# Patient Record
Sex: FEMALE | Race: ASIAN | Employment: FULL TIME | URBAN - METROPOLITAN AREA
[De-identification: names, ages, dates, MRNs, and addresses within clinical notes are randomized per-mention and may not be internally consistent; named-entity substitution may affect disease eponyms.]

---

## 2017-01-04 DIAGNOSIS — N39.0 RECURRENT UTI: Primary | ICD-10-CM

## 2017-05-15 ENCOUNTER — HOSPITAL ENCOUNTER (OUTPATIENT)
Dept: LAB | Age: 27
Discharge: HOME | End: 2017-05-15
Payer: COMMERCIAL

## 2017-05-15 ENCOUNTER — OFFICE VISIT (OUTPATIENT)
Dept: FAMILY MEDICINE CLINIC | Age: 27
End: 2017-05-15

## 2017-05-15 VITALS
WEIGHT: 135 LBS | DIASTOLIC BLOOD PRESSURE: 77 MMHG | TEMPERATURE: 97.1 F | SYSTOLIC BLOOD PRESSURE: 122 MMHG | BODY MASS INDEX: 22.49 KG/M2 | HEART RATE: 83 BPM | HEIGHT: 65 IN | RESPIRATION RATE: 16 BRPM

## 2017-05-15 DIAGNOSIS — Z13.220 SCREENING CHOLESTEROL LEVEL: ICD-10-CM

## 2017-05-15 DIAGNOSIS — Z13.1 DIABETES MELLITUS SCREENING: ICD-10-CM

## 2017-05-15 DIAGNOSIS — N92.6 IRREGULAR MENSES: ICD-10-CM

## 2017-05-15 DIAGNOSIS — Z97.5 IUD (INTRAUTERINE DEVICE) IN PLACE: ICD-10-CM

## 2017-05-15 DIAGNOSIS — H00.012 HORDEOLUM EXTERNUM OF RIGHT LOWER EYELID: ICD-10-CM

## 2017-05-15 DIAGNOSIS — Z13.220 SCREENING CHOLESTEROL LEVEL: Primary | ICD-10-CM

## 2017-05-15 LAB
ANION GAP BLD CALC-SCNC: 6 MMOL/L (ref 3–18)
BILIRUB UR QL STRIP: NEGATIVE
BUN SERPL-MCNC: 11 MG/DL (ref 7–18)
BUN/CREAT SERPL: 14 (ref 12–20)
CALCIUM SERPL-MCNC: 9.3 MG/DL (ref 8.5–10.1)
CHLORIDE SERPL-SCNC: 102 MMOL/L (ref 100–108)
CHOLEST SERPL-MCNC: 159 MG/DL
CO2 SERPL-SCNC: 29 MMOL/L (ref 21–32)
CREAT SERPL-MCNC: 0.81 MG/DL (ref 0.6–1.3)
EST. AVERAGE GLUCOSE BLD GHB EST-MCNC: 111 MG/DL
GLUCOSE SERPL-MCNC: 96 MG/DL (ref 74–99)
GLUCOSE UR-MCNC: NEGATIVE MG/DL
HBA1C MFR BLD: 5.5 % (ref 4.2–5.6)
HCG URINE, QL. (POC): NEGATIVE
HDLC SERPL-MCNC: 79 MG/DL (ref 40–60)
HDLC SERPL: 2 {RATIO} (ref 0–5)
KETONES P FAST UR STRIP-MCNC: NEGATIVE MG/DL
LDLC SERPL CALC-MCNC: 67.4 MG/DL (ref 0–100)
LIPID PROFILE,FLP: ABNORMAL
PH UR STRIP: 7 [PH] (ref 4.6–8)
POTASSIUM SERPL-SCNC: 4.3 MMOL/L (ref 3.5–5.5)
PROT UR QL STRIP: NORMAL MG/DL
SODIUM SERPL-SCNC: 137 MMOL/L (ref 136–145)
SP GR UR STRIP: 1.02 (ref 1–1.03)
TRIGL SERPL-MCNC: 63 MG/DL (ref ?–150)
UA UROBILINOGEN AMB POC: NORMAL (ref 0.2–1)
URINALYSIS CLARITY POC: CLEAR
URINALYSIS COLOR POC: YELLOW
URINE BLOOD POC: NORMAL
URINE LEUKOCYTES POC: NEGATIVE
URINE NITRITES POC: NEGATIVE
VALID INTERNAL CONTROL?: YES
VLDLC SERPL CALC-MCNC: 12.6 MG/DL

## 2017-05-15 PROCEDURE — 83036 HEMOGLOBIN GLYCOSYLATED A1C: CPT | Performed by: INTERNAL MEDICINE

## 2017-05-15 PROCEDURE — 36415 COLL VENOUS BLD VENIPUNCTURE: CPT | Performed by: INTERNAL MEDICINE

## 2017-05-15 PROCEDURE — 80048 BASIC METABOLIC PNL TOTAL CA: CPT | Performed by: INTERNAL MEDICINE

## 2017-05-15 PROCEDURE — 80061 LIPID PANEL: CPT | Performed by: INTERNAL MEDICINE

## 2017-05-15 RX ORDER — POLYMYXIN B SULFATE AND TRIMETHOPRIM 1; 10000 MG/ML; [USP'U]/ML
1 SOLUTION OPHTHALMIC EVERY 4 HOURS
Qty: 10 ML | Refills: 0 | Status: SHIPPED | OUTPATIENT
Start: 2017-05-15 | End: 2017-05-25

## 2017-05-15 NOTE — PROGRESS NOTES
1. Have you been to the ER, urgent care clinic since your last visit? Hospitalized since your last visit? No.     2. Have you seen or consulted any other health care providers outside of the [address] [place] [address] since your last visit? Include any pap smears or colon screening. No.    Patient presents with low back pain, vaginal bleeding after inter coarse and blood in her urine. Wants a pregnancy test too.

## 2017-05-15 NOTE — MR AVS SNAPSHOT
Visit Information Date & Time Provider Department Dept. Phone Encounter #  
 5/15/2017 11:00 AM Cindia Cogan, [facility] [phone] [id number] Follow-up Instructions Return if symptoms worsen or fail to improve. Upcoming Health Maintenance Date Due  
 PAP AKA CERVICAL CYTOLOGY 3/27/2017 INFLUENZA AGE 9 TO ADULT 8/1/2017 DTaP/Tdap/Td series (2 - Td) 8/12/2024 Allergies as of 5/15/2017  Review Complete On: 5/15/2017 By: Cindia Cogan, MD  
 No Known Allergies Current Immunizations  Reviewed on 6/28/2016 Name Date HPV (Quad) 2/16/2015, 10/10/2014, 8/12/2014  9:46 AM  
 Influenza Vaccine 10/10/2014 Meningococcal (MCV4P) Vaccine 8/19/2014 Tdap 8/12/2014 Not reviewed this visit You Were Diagnosed With   
  
 Codes Comments Screening cholesterol level    -  Primary ICD-10-CM: N21.900 ICD-9-CM: V77.91   
 IUD (intrauterine device) in place     ICD-10-CM: Z97.5 ICD-9-CM: V45.51 [facility] Diabetes mellitus screening     ICD-10-CM: Z13.1 ICD-9-CM: V77.1 Hordeolum externum of right lower eyelid     ICD-10-CM: H00.012 ICD-9-CM: 373.11 Vitals BP Pulse Temp Resp Height(growth percentile) Weight(growth percentile) 122/77 83 97.1 °F (36.2 °C) (Oral) 16 5' 5\" (1.651 m) 135 lb (61.2 kg) BMI OB Status Smoking Status 22.47 kg/m2 IUD Never Smoker BMI and BSA Data Body Mass Index Body Surface Area  
 22.47 kg/m 2 1.68 m 2 Preferred Pharmacy Pharmacy Name Phone [facility], [address] [facility] [phone] Your Updated Medication List  
  
   
This list is accurate as of: 5/15/17 11:29 AM.  Always use your most recent med list.  
  
  
  
  
 albuterol 90 mcg/actuation inhaler Commonly known as:  PROVENTIL HFA, VENTOLIN HFA, PROAIR HFA  
 Take 1 Puff by inhalation every six (6) hours as needed for Wheezing (or cough). EXCEDRIN EXTRA STRENGTH PO Take  by mouth.  
  
 levonorgestrel 20 mcg/24 hr (5 years) IUD Commonly known as:  MIRENA  
1 each by IntraUTERine route once. trimethoprim-polymyxin b ophthalmic solution Commonly known as:  POLYTRIM Administer 1 Drop to right eye every four (4) hours for 10 days. Prescriptions Sent to Pharmacy Refills  
 trimethoprim-polymyxin b (POLYTRIM) ophthalmic solution 0 Sig: Administer 1 Drop to right eye every four (4) hours for 10 days. Class: Normal  
 Pharmacy: [address], [address] [place] [address] #: [phone] Route: Right Eye Follow-up Instructions Return if symptoms worsen or fail to improve. To-Do List   
 05/15/2017 Lab:  HEMOGLOBIN A1C WITH EAG   
  
 05/15/2017 Lab:  LIPID PANEL   
  
 05/15/2017 Lab:  METABOLIC PANEL, BASIC Patient Instructions Learning About Birth Control: Intrauterine Device (IUD) What is an intrauterine device (IUD)? The intrauterine device (IUD) is used to prevent pregnancy. It's a small, plastic, T-shaped device. Your doctor places the IUD in your uterus. You have a choice between a hormonal IUD and a copper IUD. The hormonal IUD prevents pregnancy by damaging or killing sperm. It also releases a type of the hormone progestin. Progestin prevents pregnancy in these ways: It thickens the mucus in the cervix. This makes it hard for sperm to travel into the uterus. It also thins the lining of the uterus, which makes it harder for a fertilized egg to attach to the uterus. Progestin can sometimes stop the ovaries from releasing an egg each month (ovulation). Hormonal IUDs prevent pregnancy for 3 to 5 years, depending on which IUD is used. Once you have it, you don't have to do anything else to prevent pregnancy. The copper IUD is wrapped in copper wire. Copper IUDs prevent pregnancy by making the uterus and fallopian tubes produce a fluid that kills sperm. The copper IUD prevents pregnancy for 10 years. Once you have it, you don't have to do anything else to prevent pregnancy. A string tied to the end of the IUD hangs down through the opening of the uterus (called the cervix) into the vagina. You can check that the IUD is in place by feeling for the string. The IUD usually stays in the uterus until your doctor removes it. How well does it work? In the first year of use: · When the hormonal IUD is used exactly as directed, fewer than 1 woman out of 100 has an unplanned pregnancy. · When the copper IUD is used exactly as directed, fewer than 1 woman out of 100 has an unplanned pregnancy. Be sure to tell your doctor about any health problems you have or medicines you take. He or she can help you choose the birth control method that is right for you. What are the advantages of an IUD? · An IUD is one of the most effective methods of birth control. · It prevents pregnancy for 3 to 10 years, depending on the type. You don't have to worry about birth control during this time. · It's safe to use while breastfeeding. · IUDs don't contain estrogen. So you can use an IUD if you don't want to take estrogen or can't take estrogen because you have certain health problems or concerns. · An IUD is convenient. It is always providing birth control. You don't need to remember to take a pill or get a shot. You don't have to interrupt sex to protect against pregnancy. · A hormonal IUD may reduce heavy bleeding and cramping. What are the disadvantages of an IUD? · An IUD doesn't protect against sexually transmitted infections (STIs), such as herpes or HIV/AIDS. If you aren't sure if your sex partner might have an STI, use a condom to protect against disease. · A copper IUD may cause periods with more bleeding and cramping. · You have to see a doctor to have an IUD inserted and removed. · You have to check to see if the string is in place. Where can you learn more? Go to http://ann-hernandez.info/. Enter T273 in the search box to learn more about \"Learning About Birth Control: Intrauterine Device (IUD). \" Current as of: May 30, 2016 Content Version: 11.2 © 5644-1112 [employer]. Care instructions adapted under license by [employer] (which disclaims liability or warranty for this information). If you have questions about a medical condition or this instruction, always ask your healthcare professional. [address] any warranty or liability for your use of this information. Styes and Chalazia: Care Instructions Your Care Instructions Styes and chalazia (say \"zcx-XZD-wyi-uh\") are both conditions that can cause swelling of the eyelid. A stye is an infection in the root of an eyelash. The infection causes a tender red lump on the edge of the eyelid. The infection can spread until the whole eyelid becomes red and inflamed. Styes usually break open, and a tiny amount of pus drains. They usually clear up on their own in about a week, but they sometimes need treatment with antibiotics. A chalazion is a lump or cyst in the eyelid (chalazion is singular; chalazia is plural). It is caused by swelling and inflammation of deep oil glands inside the eyelid. Chalazia are usually not infected. They can take a few months to heal. 
If a chalazion becomes more swollen and painful or does not go away, you may need to have it drained by your doctor. Follow-up care is a key part of your treatment and safety. Be sure to make and go to all appointments, and call your doctor if you are having problems. It's also a good idea to know your test results and keep a list of the medicines you take. How can you care for yourself at home? · Do not rub your eyes. Do not squeeze or try to open a stye or chalazion. · To help a stye or chalazion heal faster: ¨ Put a warm, moist compress on your eye for 5 to 10 minutes, 3 to 6 times a day. Heat often brings a stye to a point where it drains on its own. Keep in mind that warm compresses will often increase swelling a little at first. 
¨ Do not use hot water or heat a wet cloth in a microwave oven. The compress may get too hot and can burn the eyelid. · Always wash your hands before and after you use a compress or touch your eyes. · If the doctor gave you antibiotic drops or ointment, use the medicine exactly as directed. Use the medicine for as long as instructed, even if your eye starts to feel better. · To put in eyedrops or ointment: ¨ Tilt your head back, and pull your lower eyelid down with one finger. ¨ Drop or squirt the medicine inside the lower lid. ¨ Close your eye for 30 to 60 seconds to let the drops or ointment move around. ¨ Do not touch the ointment or dropper tip to your eyelashes or any other surface. · Do not wear eye makeup or contact lenses until the stye or chalazion heals. · Do not share towels, pillows, or washcloths while you have a stye. When should you call for help? Call your doctor now or seek immediate medical care if: 
· You have pain in your eye. · You have a change in vision or loss of vision. · Redness and swelling get much worse. Watch closely for changes in your health, and be sure to contact your doctor if: 
· Your stye does not get better in 1 week. · Your chalazion does not start to get better after several weeks. Where can you learn more? Go to http://ann-hernandez.info/. Enter D883 in the search box to learn more about \"Styes and Chalazia: Care Instructions. \" Current as of: May 23, 2016 Content Version: 11.2 © 2306-4452 [employer] Seen.  Care instructions adapted under license by [address] (which disclaims liability or warranty for this information). If you have questions about a medical condition or this instruction, always ask your healthcare professional. [address] any warranty or liability for your use of this information. Introducing [facility]! Dear Yosvany Khan: Thank you for requesting a LoudClick account. Our records indicate that you already have an active LoudClick account. You can access your account anytime at [email or web address] Did you know that you can access your hospital and ER discharge instructions at any time in LoudClick? You can also review all of your test results from your hospital stay or ER visit. Additional Information If you have questions, please visit the Frequently Asked Questions section of the LoudClick website at [email or web address] Remember, LoudClick is NOT to be used for urgent needs. For medical emergencies, dial 911. Now available from your iPhone and Android! Please provide this summary of care documentation to your next provider. Your primary care clinician is listed as CHAI Malone. If you have any questions after today's visit, please call [phone].

## 2017-05-15 NOTE — PROGRESS NOTES
Chana Crenshaw is a 32 y.o. female and presents with Premenstrual Syndrome; Vaginal Bleeding; and LOW BACK PAIN       Subjective:    Having some vag bleeding after intercourse yesterday. Was having some cramping and back pain. Has not been having periods since IUD (Mirena) placement about 2 years ago. Also some blood in urine as well. [facility] Right eye- sty for 2 weeks - using compresses but no better. Needs labs for work biometric screen. Assessment/Plan:    IUD and vag bleeding- neg urine pregnancy- some RBC and protein on u/a but this is likely due to menses. Pt asked to have urine rechecked after bleeding is resolved. Discussed IUD's in general. She [facility]. Sty right eye- for 2 weeks- hot compresses and abx sent in. Labs for biometric screening done. RTC prn. Orders Placed This Encounter    LIPID PANEL     Standing Status:   Future     Standing Expiration Date:   6/34/3108    METABOLIC PANEL, BASIC     Standing Status:   Future     Standing Expiration Date:   5/16/2018    HEMOGLOBIN A1C WITH EAG     Standing Status:   Future     Standing Expiration Date:   5/16/2018    trimethoprim-polymyxin b (POLYTRIM) ophthalmic solution     Sig: Administer 1 Drop to right eye every four (4) hours for 10 days. Dispense:  10 mL     Refill:  0           ROS:  Negative except as mentioned above  Cardiac- no chest pain or palpitations  Pulmonary- no sob or wheezes  GI- no n/v or diarrhea. SH:  Social History   Substance Use Topics    Smoking status: Never Smoker    Smokeless tobacco: None    Alcohol use Yes         Medications/Allergies:  Current Outpatient Prescriptions on File Prior to Visit   Medication Sig Dispense Refill    ASPIRIN/ACETAMINOPHEN/CAFFEINE (EXCEDRIN EXTRA STRENGTH PO) Take  by mouth.       albuterol (PROVENTIL HFA, VENTOLIN HFA, PROAIR HFA) 90 mcg/actuation inhaler Take 1 Puff by inhalation every six (6) hours as needed for Wheezing (or cough). 1 Inhaler 1    levonorgestrel (MIRENA) 20 mcg/24 hr (5 years) IUD 1 each by IntraUTERine route once. No current facility-administered medications on file prior to visit. No Known Allergies    Objective:  Visit Vitals    /77    Pulse 83    Temp 97.1 °F (36.2 °C) (Oral)    Resp 16    Ht 5' 5\" (1.651 m)    Wt 135 lb (61.2 kg)    BMI 22.47 kg/m2    Body mass index is 22.47 kg/(m^2). Constitutional: Well developed, nourished, no distress, alert   HENT: Exterior ears and tympanic membranes normal bilaterally. Supple neck. No thyromegaly or lymphadenopathy. Oropharynx clear and moist mucous membranes. EYes Right eye with erythema and small papule on lid margin c/w stye   CV: S1, S2.  RRR. No murmurs/rubs. Intact distal pulses. No edema. Pulm: No abnormalities on inspection. Clear to auscultation bilaterally. No wheezing/rhonchi. Normal effort. GI: Soft, nontender, nondistended. Normal active bowel sounds. No  masses on palpation. No hepatosplenomegaly.

## 2017-05-15 NOTE — PATIENT INSTRUCTIONS
Learning About Birth Control: Intrauterine Device (IUD)  What is an intrauterine device (IUD)? The intrauterine device (IUD) is used to prevent pregnancy. It's a small, plastic, T-shaped device. Your doctor places the IUD in your uterus. You have a choice between a hormonal IUD and a copper IUD. The hormonal IUD prevents pregnancy by damaging or killing sperm. It also releases a type of the hormone progestin. Progestin prevents pregnancy in these ways: It thickens the mucus in the cervix. This makes it hard for sperm to travel into the uterus. It also thins the lining of the uterus, which makes it harder for a fertilized egg to attach to the uterus. Progestin can sometimes stop the ovaries from releasing an egg each month (ovulation). Hormonal IUDs prevent pregnancy for 3 to 5 years, depending on which IUD is used. Once you have it, you don't have to do anything else to prevent pregnancy. The copper IUD is wrapped in copper wire. Copper IUDs prevent pregnancy by making the uterus and fallopian tubes produce a fluid that kills sperm. The copper IUD prevents pregnancy for 10 years. Once you have it, you don't have to do anything else to prevent pregnancy. A string tied to the end of the IUD hangs down through the opening of the uterus (called the cervix) into the vagina. You can check that the IUD is in place by feeling for the string. The IUD usually stays in the uterus until your doctor removes it. How well does it work? In the first year of use:  · When the hormonal IUD is used exactly as directed, fewer than 1 woman out of 100 has an unplanned pregnancy. · When the copper IUD is used exactly as directed, fewer than 1 woman out of 100 has an unplanned pregnancy. Be sure to tell your doctor about any health problems you have or medicines you take. He or she can help you choose the birth control method that is right for you. What are the advantages of an IUD?   · An IUD is one of the most effective methods of birth control. · It prevents pregnancy for 3 to 10 years, depending on the type. You don't have to worry about birth control during this time. · It's safe to use while breastfeeding. · IUDs don't contain estrogen. So you can use an IUD if you don't want to take estrogen or can't take estrogen because you have certain health problems or concerns. · An IUD is convenient. It is always providing birth control. You don't need to remember to take a pill or get a shot. You don't have to interrupt sex to protect against pregnancy. · A hormonal IUD may reduce heavy bleeding and cramping. What are the disadvantages of an IUD? · An IUD doesn't protect against sexually transmitted infections (STIs), such as herpes or HIV/AIDS. If you aren't sure if your sex partner might have an STI, use a condom to protect against disease. · A copper IUD may cause periods with more bleeding and cramping. · You have to see a doctor to have an IUD inserted and removed. · You have to check to see if the string is in place. Where can you learn more? Go to http://ann-hernandez.info/. Enter I846 in the search box to learn more about \"Learning About Birth Control: Intrauterine Device (IUD). \"  Current as of: May 30, 2016  Content Version: 11.2  © 9660-2976 [employer]Eye[employer]. Care instructions adapted under license by [employer] (which disclaims liability or warranty for this information). If you have questions about a medical condition or this instruction, always ask your healthcare professional. [place] [address] any warranty or liability for your use of this information. Styes and Chalazia: Care Instructions  Your Care Instructions    Styes and chalazia (say \"nxb-GPR-gpi-uh\") are both conditions that can cause swelling of the eyelid. A stye is an infection in the root of an eyelash. The infection causes a tender red lump on the edge of the eyelid.  The infection can spread until the whole eyelid becomes red and inflamed. Styes usually break open, and a tiny amount of pus drains. They usually clear up on their own in about a week, but they sometimes need treatment with antibiotics. A chalazion is a lump or cyst in the eyelid (chalazion is singular; chalazia is plural). It is caused by swelling and inflammation of deep oil glands inside the eyelid. Chalazia are usually not infected. They can take a few months to heal.  If a chalazion becomes more swollen and painful or does not go away, you may need to have it drained by your doctor. Follow-up care is a key part of your treatment and safety. Be sure to make and go to all appointments, and call your doctor if you are having problems. It's also a good idea to know your test results and keep a list of the medicines you take. How can you care for yourself at home? · Do not rub your eyes. Do not squeeze or try to open a stye or chalazion. · To help a stye or chalazion heal faster:  ¨ Put a warm, moist compress on your eye for 5 to 10 minutes, 3 to 6 times a day. Heat often brings a stye to a point where it drains on its own. Keep in mind that warm compresses will often increase swelling a little at first.  ¨ Do not use hot water or heat a wet cloth in a microwave oven. The compress may get too hot and can burn the eyelid. · Always wash your hands before and after you use a compress or touch your eyes. · If the doctor gave you antibiotic drops or ointment, use the medicine exactly as directed. Use the medicine for as long as instructed, even if your eye starts to feel better. · To put in eyedrops or ointment:  ¨ Tilt your head back, and pull your lower eyelid down with one finger. ¨ Drop or squirt the medicine inside the lower lid. ¨ Close your eye for 30 to 60 seconds to let the drops or ointment move around. ¨ Do not touch the ointment or dropper tip to your eyelashes or any other surface.   · Do not wear eye makeup or contact lenses until the stye or chalazion heals. · Do not share towels, pillows, or washcloths while you have a stye. When should you call for help? Call your doctor now or seek immediate medical care if:  · You have pain in your eye. · You have a change in vision or loss of vision. · Redness and swelling get much worse. Watch closely for changes in your health, and be sure to contact your doctor if:  · Your stye does not get better in 1 week. · Your chalazion does not start to get better after several weeks. Where can you learn more? Go to http://ann-hernandez.info/. Enter B653 in the search box to learn more about \"Styes and Chalazia: Care Instructions. \"  Current as of: May 23, 2016  Content Version: 11.2  © 6684-8504 [employer]. Care instructions adapted under license by [employer] (which disclaims liability or warranty for this information). If you have questions about a medical condition or this instruction, always ask your healthcare professional. [address] any warranty or liability for your use of this information.

## 2017-05-24 ENCOUNTER — TELEPHONE (OUTPATIENT)
Dept: FAMILY MEDICINE CLINIC | Age: 27
End: 2017-05-24

## 2017-05-24 NOTE — TELEPHONE ENCOUNTER
I left her a voicemail message on her cell phone to call me back regarding her lab results and paperwork.

## 2017-05-25 ENCOUNTER — DOCUMENTATION ONLY (OUTPATIENT)
Dept: FAMILY MEDICINE CLINIC | Age: 27
End: 2017-05-25

## 2017-05-25 NOTE — PROGRESS NOTES
I left her another voice mail message on her cellphone to call our office to discuss her lab results and her biometric screening form that she needs to sign. Not sure if its ok to go ahead and faxed it without her signature.

## 2018-01-12 ENCOUNTER — OFFICE VISIT (OUTPATIENT)
Dept: FAMILY MEDICINE CLINIC | Age: 28
End: 2018-01-12

## 2018-01-12 VITALS
DIASTOLIC BLOOD PRESSURE: 77 MMHG | TEMPERATURE: 97.6 F | OXYGEN SATURATION: 99 % | HEART RATE: 62 BPM | RESPIRATION RATE: 18 BRPM | BODY MASS INDEX: 22.44 KG/M2 | HEIGHT: 65 IN | WEIGHT: 134.7 LBS | SYSTOLIC BLOOD PRESSURE: 119 MMHG

## 2018-01-12 DIAGNOSIS — Z00.00 LABORATORY TESTS ORDERED AS PART OF A COMPLETE PHYSICAL EXAM (CPE): ICD-10-CM

## 2018-01-12 DIAGNOSIS — B00.1 RECURRENT COLD SORES: Primary | ICD-10-CM

## 2018-01-12 RX ORDER — VALACYCLOVIR HYDROCHLORIDE 1 G/1
1000 TABLET, FILM COATED ORAL 2 TIMES DAILY
Qty: 20 TAB | Refills: 0 | Status: SHIPPED | OUTPATIENT
Start: 2018-01-12 | End: 2018-07-20

## 2018-01-12 RX ORDER — LIDOCAINE HYDROCHLORIDE 20 MG/ML
15 SOLUTION OROPHARYNGEAL AS NEEDED
Qty: 1 BOTTLE | Refills: 0 | Status: SHIPPED | OUTPATIENT
Start: 2018-01-12 | End: 2019-06-18

## 2018-01-12 NOTE — PROGRESS NOTES
1. Have you been to the ER, urgent care clinic since your last visit? Hospitalized since your last visit? No    2. Have you seen or consulted any other health care providers outside of the [address] [place] [address] since your last visit? Include any pap smears or colon screening. No       Patient here today for blisters inside of mouth times 2 days.

## 2018-01-12 NOTE — MR AVS SNAPSHOT
Visit Information Date & Time Provider Department Dept. Phone Encounter #  
 1/12/2018  4:30 PM Sue BarrosHardy [id number] Follow-up Instructions Return if symptoms worsen or fail to improve. Upcoming Health Maintenance Date Due  
 PAP AKA CERVICAL CYTOLOGY 3/27/2017 Influenza Age 5 to Adult 8/1/2017 DTaP/Tdap/Td series (2 - Td) 8/12/2024 Allergies as of 1/12/2018  Review Complete On: 1/12/2018 By: Sue Barros NP No Known Allergies Current Immunizations  Reviewed on 6/28/2016 Name Date HPV (Quad) 2/16/2015, 10/10/2014, 8/12/2014  9:46 AM  
 Influenza Vaccine 10/10/2014 Meningococcal (MCV4P) Vaccine 8/19/2014 Tdap 8/12/2014 Not reviewed this visit You Were Diagnosed With   
  
 Codes Comments Recurrent cold sores    -  Primary ICD-10-CM: B00.1 ICD-9-CM: 054.9 Vitals BP Pulse Temp Resp Height(growth percentile) Weight(growth percentile) 119/77 (BP 1 Location: Left arm, BP Patient Position: Sitting) 62 97.6 °F (36.4 °C) (Oral) 18 5' 5\" (1.651 m) 134 lb 11.2 oz (61.1 kg) SpO2 BMI OB Status Smoking Status 99% 22.42 kg/m2 IUD Never Smoker Vitals History BMI and BSA Data Body Mass Index Body Surface Area  
 22.42 kg/m 2 1.67 m 2 Preferred Pharmacy Pharmacy Name Phone [facility] [address], [address] [facility] [phone] Your Updated Medication List  
  
   
This list is accurate as of: 1/12/18  4:42 PM.  Always use your most recent med list.  
  
  
  
  
 albuterol 90 mcg/actuation inhaler Commonly known as:  PROVENTIL HFA, VENTOLIN HFA, PROAIR HFA Take 1 Puff by inhalation every six (6) hours as needed for Wheezing (or cough). EXCEDRIN EXTRA STRENGTH PO Take  by mouth.  
  
 levonorgestrel 20 mcg/24 hr (5 years) Iud  
Commonly known as:  MIRENA  
1 each by IntraUTERine route once. lidocaine 2 % solution Commonly known as:  XYLOCAINE Take 15 mL by mouth as needed for Pain. Indications: MOUTH IRRITATION  
  
 valACYclovir 1 gram tablet Commonly known as:  VALTREX Take 1 Tab by mouth two (2) times a day. Prescriptions Sent to Pharmacy Refills  
 valACYclovir (VALTREX) 1 gram tablet 0 Sig: Take 1 Tab by mouth two (2) times a day. Class: Normal  
 Pharmacy: [address], [address] [place] [address] #: [phone] Route: Oral  
 lidocaine (XYLOCAINE) 2 % solution 0 Sig: Take 15 mL by mouth as needed for Pain. Indications: MOUTH IRRITATION Class: Normal  
 Pharmacy: [address], [address] [place] [address] #: [phone] Route: Oral  
  
Follow-up Instructions Return if symptoms worsen or fail to improve. Introducing [facility] SERVICES! Dear Bina Bryant: Thank you for requesting a Status4 account. Our records indicate that you already have an active Status4 account. You can access your account anytime at [email or web address] Did you know that you can access your hospital and ER discharge instructions at any time in Status4? You can also review all of your test results from your hospital stay or ER visit. Additional Information If you have questions, please visit the Frequently Asked Questions section of the Status4 website at [email or web address] Remember, Status4 is NOT to be used for urgent needs. For medical emergencies, dial 911. Now available from your iPhone and Android! Please provide this summary of care documentation to your next provider. Your primary care clinician is listed as CHAI Malone. If you have any questions after today's visit, please call [phone].

## 2018-01-15 ENCOUNTER — OFFICE VISIT (OUTPATIENT)
Dept: FAMILY MEDICINE CLINIC | Age: 28
End: 2018-01-15

## 2018-01-15 ENCOUNTER — HOSPITAL ENCOUNTER (OUTPATIENT)
Dept: LAB | Age: 28
Discharge: HOME | End: 2018-01-15
Payer: COMMERCIAL

## 2018-01-15 VITALS
TEMPERATURE: 96.7 F | WEIGHT: 134.6 LBS | SYSTOLIC BLOOD PRESSURE: 112 MMHG | OXYGEN SATURATION: 100 % | DIASTOLIC BLOOD PRESSURE: 78 MMHG | HEIGHT: 65 IN | HEART RATE: 73 BPM | RESPIRATION RATE: 18 BRPM | BODY MASS INDEX: 22.42 KG/M2

## 2018-01-15 DIAGNOSIS — K12.0 RECURRENT APHTHOUS STOMATITIS: Primary | ICD-10-CM

## 2018-01-15 DIAGNOSIS — Z71.1 CONCERN ABOUT STD IN FEMALE WITHOUT DIAGNOSIS: ICD-10-CM

## 2018-01-15 DIAGNOSIS — K12.0 RECURRENT APHTHOUS STOMATITIS: ICD-10-CM

## 2018-01-15 LAB
BASOPHILS # BLD: 0 K/UL (ref 0–0.06)
BASOPHILS NFR BLD: 1 % (ref 0–2)
DIFFERENTIAL METHOD BLD: ABNORMAL
EOSINOPHIL # BLD: 0.1 K/UL (ref 0–0.4)
EOSINOPHIL NFR BLD: 1 % (ref 0–5)
ERYTHROCYTE [DISTWIDTH] IN BLOOD BY AUTOMATED COUNT: 12.4 % (ref 11.6–14.5)
ERYTHROCYTE [SEDIMENTATION RATE] IN BLOOD: 10 MM/HR (ref 0–20)
HCT VFR BLD AUTO: 39.8 % (ref 35–45)
HGB BLD-MCNC: 13.3 G/DL (ref 12–16)
IRON SATN MFR SERPL: 35 %
IRON SERPL-MCNC: 112 UG/DL (ref 50–175)
LYMPHOCYTES # BLD: 2.1 K/UL (ref 0.9–3.6)
LYMPHOCYTES NFR BLD: 32 % (ref 21–52)
MCH RBC QN AUTO: 32.6 PG (ref 24–34)
MCHC RBC AUTO-ENTMCNC: 33.4 G/DL (ref 31–37)
MCV RBC AUTO: 97.5 FL (ref 74–97)
MONOCYTES # BLD: 0.6 K/UL (ref 0.05–1.2)
MONOCYTES NFR BLD: 9 % (ref 3–10)
NEUTS SEG # BLD: 3.9 K/UL (ref 1.8–8)
NEUTS SEG NFR BLD: 57 % (ref 40–73)
PLATELET # BLD AUTO: 310 K/UL (ref 135–420)
PMV BLD AUTO: 9.7 FL (ref 9.2–11.8)
RBC # BLD AUTO: 4.08 M/UL (ref 4.2–5.3)
TIBC SERPL-MCNC: 323 UG/DL (ref 250–450)
VIT B12 SERPL-MCNC: 760 PG/ML (ref 211–911)
WBC # BLD AUTO: 6.7 K/UL (ref 4.6–13.2)

## 2018-01-15 PROCEDURE — 86695 HERPES SIMPLEX TYPE 1 TEST: CPT | Performed by: NURSE PRACTITIONER

## 2018-01-15 PROCEDURE — 85025 COMPLETE CBC W/AUTO DIFF WBC: CPT | Performed by: NURSE PRACTITIONER

## 2018-01-15 PROCEDURE — 87905 IA NZMTC ACTV OTH/THN VIRUS: CPT | Performed by: NURSE PRACTITIONER

## 2018-01-15 PROCEDURE — 82607 VITAMIN B-12: CPT | Performed by: NURSE PRACTITIONER

## 2018-01-15 PROCEDURE — 36415 COLL VENOUS BLD VENIPUNCTURE: CPT | Performed by: NURSE PRACTITIONER

## 2018-01-15 PROCEDURE — 83540 ASSAY OF IRON: CPT | Performed by: NURSE PRACTITIONER

## 2018-01-15 PROCEDURE — 85652 RBC SED RATE AUTOMATED: CPT | Performed by: NURSE PRACTITIONER

## 2018-01-15 NOTE — PATIENT INSTRUCTIONS
Canker Sore: Care Instructions  Your Care Instructions  Canker sores are painful white sores in the mouth. They usually begin with a tingling feeling, followed by a red spot or bump that turns white. Canker sores appear most often on the tongue, inside the cheeks, and inside the lips. They can be very painful and can make talking, eating, and drinking difficult. A canker sore may form after an injury or stretching of tissues in the mouth, which can happen, for example, during a dental procedure or teeth cleaning. If you accidentally bite your tongue or the inside of your cheek, you may end up with a canker sore. Other possible causes are infection, certain foods, and stress. Canker sores are not contagious. The pain from your canker sore should decrease in 7 to 10 days, and it should heal completely in 1 to 3 weeks. In most cases, a canker sore will go away by itself. Home treatment can ease pain and discomfort. If you have a large or deep canker sore that does not seem to be getting better after 2 weeks, your doctor may prescribe medicine. Canker sores often come back again. Follow-up care is a key part of your treatment and safety. Be sure to make and go to all appointments, and call your doctor if you are having problems. It's also a good idea to know your test results and keep a list of the medicines you take. How can you care for yourself at home? · Drink cold liquids, such as water or iced tea, or eat flavored ice pops or frozen juices. Use a straw to keep the liquid from coming in contact with your canker sore. · Eat soft, bland foods that are easy to chew and swallow, such as ice cream, custard, applesauce, cottage cheese, macaroni and cheese, soft-cooked eggs, yogurt, or cream soups. · Cut foods into small pieces, or grind, mash, blend, or puree foods to make them easier to chew and swallow.   · While your canker sore heals, avoid coffee, chocolate, spicy and salty foods, citrus fruits, nuts, seeds, and tomatoes. · To soothe your canker sore and help it heal:  ¨ Use an over-the-counter numbing medicine, such as Orabase or Anbesol. ¨ Dab a bit of Milk of Magnesia on the canker sore 3 or 4 times a day. · Put ice on your sore to reduce the pain. · Take anti-inflammatory medicines to reduce pain, as needed. These include ibuprofen (Advil, Motrin) and naproxen (Aleve). Read and follow all instructions on the label. · Use a soft-bristle toothbrush, and brush your teeth well but carefully. · Do not smoke or use spit tobacco. Tobacco can cause mouth problems and slow healing. If you need help quitting, talk to your doctor about stop-smoking programs and medicines. These can increase your chances of quitting for good. When should you call for help? Call your doctor now or seek immediate medical care if:  ? · You have signs of infection, such as:  ¨ Increased pain, swelling, warmth, or redness. ¨ Red streaks leading from the area. ¨ Pus draining from the area. ¨ A fever. ? Watch closely for changes in your health, and be sure to contact your doctor if:  ? · You do not get better as expected. Where can you learn more? Go to http://ann-hernandez.info/. Enter P088 in the search box to learn more about \"Canker Sore: Care Instructions. \"  Current as of: May 12, 2017  Content Version: 11.4  © 9480-9408 [employer]. Care instructions adapted under license by [employer] (which disclaims liability or warranty for this information). If you have questions about a medical condition or this instruction, always ask your healthcare professional. [place] [address] any warranty or liability for your use of this information.

## 2018-01-15 NOTE — MR AVS SNAPSHOT
Visit Information Date & Time Provider Department Dept. Phone Encounter #  
 1/15/2018  9:30 AM Andrew Kong NP [address] [facility] [phone] [id number] Upcoming Health Maintenance Date Due  
 PAP AKA CERVICAL CYTOLOGY 3/27/2017 Influenza Age 5 to Adult 8/1/2017 DTaP/Tdap/Td series (2 - Td) 8/12/2024 Allergies as of 1/15/2018  Review Complete On: 1/15/2018 By: Emi Nunez No Known Allergies Current Immunizations  Reviewed on 6/28/2016 Name Date HPV (Quad) 2/16/2015, 10/10/2014, 8/12/2014  9:46 AM  
 Influenza Vaccine 10/10/2014 Meningococcal (MCV4P) Vaccine 8/19/2014 Tdap 8/12/2014 Not reviewed this visit You Were Diagnosed With   
  
 Codes Comments Recurrent aphthous stomatitis    -  Primary ICD-10-CM: K12.0 ICD-9-CM: 528.2 Concern about STD in female without diagnosis     ICD-10-CM: Z71.1 ICD-9-CM: V65.5 Vitals BP Pulse Temp Resp Height(growth percentile) Weight(growth percentile) 112/78 (BP 1 Location: Left arm, BP Patient Position: Sitting) 73 96.7 °F (35.9 °C) (Oral) 18 5' 5\" (1.651 m) 134 lb 9.6 oz (61.1 kg) SpO2 BMI OB Status Smoking Status 100% 22.4 kg/m2 IUD Never Smoker BMI and BSA Data Body Mass Index Body Surface Area  
 22.4 kg/m 2 1.67 m 2 Preferred Pharmacy Pharmacy Name Phone [address], [address] [facility] [phone] Your Updated Medication List  
  
   
This list is accurate as of: 1/15/18 10:19 AM.  Always use your most recent med list.  
  
  
  
  
 albuterol 90 mcg/actuation inhaler Commonly known as:  PROVENTIL HFA, VENTOLIN HFA, PROAIR HFA Take 1 Puff by inhalation every six (6) hours as needed for Wheezing (or cough). EXCEDRIN EXTRA STRENGTH PO Take  by mouth.  
  
 levonorgestrel 20 mcg/24 hr (5 years) Iud  
Commonly known as:  MIRENA  
1 each by IntraUTERine route once. lidocaine 2 % solution Commonly known as:  XYLOCAINE Take 15 mL by mouth as needed for Pain. Indications: MOUTH IRRITATION  
  
 valACYclovir 1 gram tablet Commonly known as:  VALTREX Take 1 Tab by mouth two (2) times a day. To-Do List   
 01/15/2018 Lab:  CBC WITH AUTOMATED DIFF   
  
 01/15/2018 Lab:  HSV-1 AB, IGG GLYCOPROTEIN, G-SPECIFIC   
  
 01/15/2018 Lab:  IRON PROFILE   
  
 01/15/2018 Lab:  RBC, FOLATE   
  
 01/15/2018 Lab:  SED RATE (ESR)   
  
 01/15/2018 Lab:  VITAMIN B12 Patient Instructions Canker Sore: Care Instructions Your Care Instructions Canker sores are painful white sores in the mouth. They usually begin with a tingling feeling, followed by a red spot or bump that turns white. Canker sores appear most often on the tongue, inside the cheeks, and inside the lips. They can be very painful and can make talking, eating, and drinking difficult. A canker sore may form after an injury or stretching of tissues in the mouth, which can happen, for example, during a dental procedure or teeth cleaning. If you accidentally bite your tongue or the inside of your cheek, you may end up with a canker sore. Other possible causes are infection, certain foods, and stress. Canker sores are not contagious. The pain from your canker sore should decrease in 7 to 10 days, and it should heal completely in 1 to 3 weeks. In most cases, a canker sore will go away by itself. Home treatment can ease pain and discomfort. If you have a large or deep canker sore that does not seem to be getting better after 2 weeks, your doctor may prescribe medicine. Canker sores often come back again. Follow-up care is a key part of your treatment and safety. Be sure to make and go to all appointments, and call your doctor if you are having problems. It's also a good idea to know your test results and keep a list of the medicines you take. How can you care for yourself at home? · Drink cold liquids, such as water or iced tea, or eat flavored ice pops or frozen juices. Use a straw to keep the liquid from coming in contact with your canker sore. · Eat soft, bland foods that are easy to chew and swallow, such as ice cream, custard, applesauce, cottage cheese, macaroni and cheese, soft-cooked eggs, yogurt, or cream soups. · Cut foods into small pieces, or grind, mash, blend, or puree foods to make them easier to chew and swallow. · While your canker sore heals, avoid coffee, chocolate, spicy and salty foods, citrus fruits, nuts, seeds, and tomatoes. · To soothe your canker sore and help it heal: ¨ Use an over-the-counter numbing medicine, such as Orabase or Anbesol. ¨ Dab a bit of Milk of Magnesia on the canker sore 3 or 4 times a day. · Put ice on your sore to reduce the pain. · Take anti-inflammatory medicines to reduce pain, as needed. These include ibuprofen (Advil, Motrin) and naproxen (Aleve). Read and follow all instructions on the label. · Use a soft-bristle toothbrush, and brush your teeth well but carefully. · Do not smoke or use spit tobacco. Tobacco can cause mouth problems and slow healing. If you need help quitting, talk to your doctor about stop-smoking programs and medicines. These can increase your chances of quitting for good. When should you call for help? Call your doctor now or seek immediate medical care if: 
? · You have signs of infection, such as: 
¨ Increased pain, swelling, warmth, or redness. ¨ Red streaks leading from the area. ¨ Pus draining from the area. ¨ A fever. ? Watch closely for changes in your health, and be sure to contact your doctor if: 
? · You do not get better as expected. Where can you learn more? Go to http://ann-hernandez.info/. Enter I169 in the search box to learn more about \"Canker Sore: Care Instructions. \" Current as of: May 12, 2017 Content Version: 11.4 © 9843-9526 [employer]. Care instructions adapted under license by [employer] (which disclaims liability or warranty for this information). If you have questions about a medical condition or this instruction, always ask your healthcare professional. [address] any warranty or liability for your use of this information. Introducing Boris! Dear Boston Roca: Thank you for requesting a Benefitter account. Our records indicate that you already have an active Benefitter account. You can access your account anytime at [email or web address] Did you know that you can access your hospital and ER discharge instructions at any time in Benefitter? You can also review all of your test results from your hospital stay or ER visit. Additional Information If you have questions, please visit the Frequently Asked Questions section of the Benefitter website at [email or web address] Remember, Benefitter is NOT to be used for urgent needs. For medical emergencies, dial 911. Now available from your iPhone and Android! Please provide this summary of care documentation to your next provider. Your primary care clinician is listed as CHAI Malone. If you have any questions after today's visit, please call [phone].

## 2018-01-15 NOTE — PROGRESS NOTES
Subjective:   Idris Eddy is a 29 y.o. female here for an acute visit for    Chief Complaint   Patient presents with    Mouth Lesions       HPI      Onset[de-identified] the current episode has been for a couple of days, but she has had these in the past  Location: oral mucosa, mostly in front  Duration: constant  Characteristics: painful, white, clustered, hard to talk and eat or drink  Aggravating: talking, eating or drinking  Reliving: nothing  Treatment: nothing  Pertinent PMH: mouth ulcers  Pertinent negatives:  No external oral lesions, no lesions elsewhere on body, n/v/c/d, fever, headache, sore throat, swollen nodes, jaw pain      ROS  As above, the rest are negative    Current Outpatient Prescriptions   Medication Sig Dispense Refill    valACYclovir (VALTREX) 1 gram tablet Take 1 Tab by mouth two (2) times a day. 20 Tab 0    lidocaine (XYLOCAINE) 2 % solution Take 15 mL by mouth as needed for Pain. Indications: MOUTH IRRITATION 1 Bottle 0    ASPIRIN/ACETAMINOPHEN/CAFFEINE (EXCEDRIN EXTRA STRENGTH PO) Take  by mouth.  albuterol (PROVENTIL HFA, VENTOLIN HFA, PROAIR HFA) 90 mcg/actuation inhaler Take 1 Puff by inhalation every six (6) hours as needed for Wheezing (or cough). 1 Inhaler 1    levonorgestrel (MIRENA) 20 mcg/24 hr (5 years) IUD 1 each by IntraUTERine route once. 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 Appearance: alert, well appearing, and in no distress, oriented to person, place, and time and normal appearing weight. ENT normal.  Neck supple. No adenopathy or thyromegaly. PERRLA. Lungs are clear, good air entry, no wheezes, rhonchi or rales. S1 and S2 normal, no murmurs, regular rate and rhythm. Extremities show no edema, normal peripheral pulses. Neurological is normal, no focal findings. Mouth: white circumscribed lesions <1mm in groups on mucosa    Labwork and Ancillary Studies:    CBC w/Diff  Lab Results   Component Value Date/Time    WBC 11.2 10/14/2015 01:51 PM    HGB 13.4 10/14/2015 01:51 PM    PLATELET 373 67/09/3998 01:51 PM         Basic Metabolic Profile  Lab Results   Component Value Date/Time    Sodium 137 05/15/2017 11:41 AM    Potassium 4.3 05/15/2017 11:41 AM    Chloride 102 05/15/2017 11:41 AM    CO2 29 05/15/2017 11:41 AM    Anion gap 6 05/15/2017 11:41 AM    Glucose 96 05/15/2017 11:41 AM    BUN 11 05/15/2017 11:41 AM    Creatinine 0.81 05/15/2017 11:41 AM    BUN/Creatinine ratio 14 05/15/2017 11:41 AM    GFR est AA >60 05/15/2017 11:41 AM    GFR est non-AA >60 05/15/2017 11:41 AM    Calcium 9.3 05/15/2017 11:41 AM        Cholesterol  Lab Results   Component Value Date/Time    Cholesterol, total 159 05/15/2017 11:41 AM    HDL Cholesterol 79 05/15/2017 11:41 AM    LDL, calculated 67.4 05/15/2017 11:41 AM    Triglyceride 63 05/15/2017 11:41 AM    CHOL/HDL Ratio 2.0 05/15/2017 11:41 AM          Assessment/Plan:    Diagnoses and all orders for this visit:    1. Recurrent cold sores  -     valACYclovir (VALTREX) 1 gram tablet; Take 1 Tab by mouth two (2) times a day. -     lidocaine (XYLOCAINE) 2 % solution; Take 15 mL by mouth as needed for Pain. Indications: MOUTH IRRITATION    2. Laboratory tests ordered as part of a complete physical exam (CPE)  -     LIPID PANEL; Future  -     GLUCOSE, RANDOM; Future        ICD-10-CM ICD-9-CM    1. Recurrent cold sores B00.1 054.9 valACYclovir (VALTREX) 1 gram tablet      lidocaine (XYLOCAINE) 2 % solution   2.  Laboratory tests ordered as part of a complete physical exam (CPE) Z00.00 V72.62 LIPID PANEL      GLUCOSE, RANDOM      -Cannot rule out HSV at this time, will treat with valtrex for now. Lidocaine viscous for pain.  -Lab done for work wellness plan    Return to clinic if sxs persist, to ER if sxs worsen    Patient verbalized understanding to above instructions. AVS printed and given to pt. Kaila Baird [facility]  [address] [facility] [place]   [address] [facility] [address]  [address]

## 2018-01-15 NOTE — PROGRESS NOTES
Subjective:   Terry Contreras is a 29 y.o. female here for an acute visit for    Chief Complaint   Patient presents with    Follow-up     on mouth ulcers       HPI    Comes to the office today for follow up of mouth sores. Is requesting a STD workup as she is not monogamous and engages in sexual activity with male and female partners. Currently with new partner who has a \"bump\" on his genitalia, he too is being tested. She has not current signs and symptoms consistent with STD's at this time. She would also like to get labs drawn for her work wellness program.    ROS  As above, the rest are negative    Current Outpatient Prescriptions   Medication Sig Dispense Refill    valACYclovir (VALTREX) 1 gram tablet Take 1 Tab by mouth two (2) times a day. 20 Tab 0    lidocaine (XYLOCAINE) 2 % solution Take 15 mL by mouth as needed for Pain. Indications: MOUTH IRRITATION 1 Bottle 0    ASPIRIN/ACETAMINOPHEN/CAFFEINE (EXCEDRIN EXTRA STRENGTH PO) Take  by mouth.  albuterol (PROVENTIL HFA, VENTOLIN HFA, PROAIR HFA) 90 mcg/actuation inhaler Take 1 Puff by inhalation every six (6) hours as needed for Wheezing (or cough). 1 Inhaler 1    levonorgestrel (MIRENA) 20 mcg/24 hr (5 years) IUD 1 each by IntraUTERine route once. 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 Appearance: alert, well appearing, and in no distress and oriented to person, place, and time. ENT normal.  Neck supple. No adenopathy or thyromegaly. PERRLA. Lungs are clear, good air entry, no wheezes, rhonchi or rales. S1 and S2 normal, no murmurs, regular rate and rhythm.    Extremities show no edema, normal peripheral pulses. Neurological is normal, no focal findings. Mouth: with herpetiform lesions, white, <1mm    Labwork and Ancillary Studies:    CBC w/Diff  Lab Results   Component Value Date/Time    WBC 11.2 10/14/2015 01:51 PM    HGB 13.4 10/14/2015 01:51 PM    PLATELET 839 18/16/5790 01:51 PM         Basic Metabolic Profile  Lab Results   Component Value Date/Time    Sodium 137 05/15/2017 11:41 AM    Potassium 4.3 05/15/2017 11:41 AM    Chloride 102 05/15/2017 11:41 AM    CO2 29 05/15/2017 11:41 AM    Anion gap 6 05/15/2017 11:41 AM    Glucose 96 05/15/2017 11:41 AM    BUN 11 05/15/2017 11:41 AM    Creatinine 0.81 05/15/2017 11:41 AM    BUN/Creatinine ratio 14 05/15/2017 11:41 AM    GFR est AA >60 05/15/2017 11:41 AM    GFR est non-AA >60 05/15/2017 11:41 AM    Calcium 9.3 05/15/2017 11:41 AM        Cholesterol  Lab Results   Component Value Date/Time    Cholesterol, total 159 05/15/2017 11:41 AM    HDL Cholesterol 79 05/15/2017 11:41 AM    LDL, calculated 67.4 05/15/2017 11:41 AM    Triglyceride 63 05/15/2017 11:41 AM    CHOL/HDL Ratio 2.0 05/15/2017 11:41 AM          Assessment/Plan:    Diagnoses and all orders for this visit:    1. Recurrent aphthous stomatitis  -     CBC WITH AUTOMATED DIFF; Future  -     SED RATE (ESR); Future  -     VITAMIN B12; Future  -     RBC, FOLATE; Future  -     IRON PROFILE; Future    -Check vit and iron levels as possible source of aphthous stomatitis. 2. Concern about STD in female without diagnosis  -     BV+CT+NG+TV BY VERONICA + YEAST; Future  -     HSV-1 AB, IGG GLYCOPROTEIN, G-SPECIFIC; Future        ICD-10-CM ICD-9-CM    1. Recurrent aphthous stomatitis K12.0 528.2 CBC WITH AUTOMATED DIFF      SED RATE (ESR)      VITAMIN B12      RBC, FOLATE      IRON PROFILE   2. Concern about STD in female without diagnosis Z71.1 V65.5 BV+CT+NG+TV BY VERONICA + YEAST      HSV-1 AB, IGG GLYCOPROTEIN, G-SPECIFIC      -Instructed to her discontinue acyclovir.  Continue with lidocaine viscous as needed and could add benadryl liquid swish and spit for comfort. Return to clinic if sxs persist, to ER if sxs worsen    Patient verbalized understanding to above instructions. AVS printed and given to pt. Jason Perales, [facility]  [address] [facility] [place]   [address] [facility] [address]  [address]

## 2018-01-15 NOTE — PROGRESS NOTES
1. Have you been to the ER, urgent care clinic since your last visit? Hospitalized since your last visit? No    2. Have you seen or consulted any other health care providers outside of the [address] [place] [address] since your last visit? Include any pap smears or colon screening.  No     Patient here today for labs

## 2018-01-16 DIAGNOSIS — Z71.1 CONCERN ABOUT STD IN FEMALE WITHOUT DIAGNOSIS: Primary | ICD-10-CM

## 2018-01-16 LAB — HSV1 IGG SER IA-ACNC: <0.91 INDEX (ref 0–0.9)

## 2018-01-18 DIAGNOSIS — Z20.2 POSSIBLE EXPOSURE TO STD: Primary | ICD-10-CM

## 2018-01-20 LAB
BACTERIAL SIALIDASE SPEC QL: NEGATIVE
SPECIMEN SOURCE: NORMAL
YEAST GENITAL QL CULT: NEGATIVE

## 2018-06-15 ENCOUNTER — OFFICE VISIT (OUTPATIENT)
Dept: FAMILY MEDICINE CLINIC | Age: 28
End: 2018-06-15

## 2018-06-15 VITALS
HEIGHT: 65 IN | BODY MASS INDEX: 21.83 KG/M2 | OXYGEN SATURATION: 96 % | RESPIRATION RATE: 16 BRPM | WEIGHT: 131 LBS | DIASTOLIC BLOOD PRESSURE: 66 MMHG | TEMPERATURE: 97 F | HEART RATE: 72 BPM | SYSTOLIC BLOOD PRESSURE: 99 MMHG

## 2018-06-15 VITALS
BODY MASS INDEX: 21.83 KG/M2 | WEIGHT: 131 LBS | RESPIRATION RATE: 17 BRPM | HEART RATE: 72 BPM | SYSTOLIC BLOOD PRESSURE: 99 MMHG | OXYGEN SATURATION: 97 % | DIASTOLIC BLOOD PRESSURE: 66 MMHG | TEMPERATURE: 97 F | HEIGHT: 65 IN

## 2018-06-15 DIAGNOSIS — J06.9 VIRAL UPPER RESPIRATORY TRACT INFECTION: Primary | ICD-10-CM

## 2018-06-15 RX ORDER — AZITHROMYCIN 250 MG/1
TABLET, FILM COATED ORAL
Qty: 6 TAB | Refills: 0 | Status: SHIPPED | OUTPATIENT
Start: 2018-06-15 | End: 2018-07-20

## 2018-06-15 NOTE — PROGRESS NOTES
Calvin Box is a 29 y.o. female and presents with Cough (for a week) and Cold Symptoms       Subjective:    Having nasal congestion and cough for 1 week some frontal pain as well. No fevers or chills no nausea vomiting no diarrhea    Assessment/Plan:    Upper respiratory infection over-the-counter medications for supportive care recommended but patient given prescription for antibiotic to use if symptoms do not resolve in the next 3 days or so. She will call for any worsening or lack of improvement    RTC  as needed          ROS:  Negative except as mentioned above  Cardiac- no chest pain or palpitations  Pulmonary- no sob or wheezes  GI- no n/v or diarrhea. SH:  Social History   Substance Use Topics    Smoking status: Never Smoker    Smokeless tobacco: Never Used    Alcohol use Yes         Medications/Allergies:  Current Outpatient Prescriptions on File Prior to Visit   Medication Sig Dispense Refill    ASPIRIN/ACETAMINOPHEN/CAFFEINE (EXCEDRIN EXTRA STRENGTH PO) Take  by mouth.  albuterol (PROVENTIL HFA, VENTOLIN HFA, PROAIR HFA) 90 mcg/actuation inhaler Take 1 Puff by inhalation every six (6) hours as needed for Wheezing (or cough). 1 Inhaler 1    levonorgestrel (MIRENA) 20 mcg/24 hr (5 years) IUD 1 each by IntraUTERine route once.  azithromycin (ZITHROMAX) 250 mg tablet Take two tablets today then one tablet daily 6 Tab 0    valACYclovir (VALTREX) 1 gram tablet Take 1 Tab by mouth two (2) times a day. 20 Tab 0    lidocaine (XYLOCAINE) 2 % solution Take 15 mL by mouth as needed for Pain. Indications: MOUTH IRRITATION 1 Bottle 0     No current facility-administered medications on file prior to visit. No Known Allergies    Objective:  Visit Vitals    BP 99/66    Pulse 72    Temp 97 °F (36.1 °C) (Oral)    Resp 17    Ht 5' 5\" (1.651 m)    Wt 131 lb (59.4 kg)    SpO2 97%    BMI 21.8 kg/m2    Body mass index is 21.8 kg/(m^2).   Constitutional: Well developed, nourished, no distress, alert   HENT: Exterior ears and tympanic membranes normal bilaterally. Supple neck. No thyromegaly or lymphadenopathy. Oropharynx clear and moist mucous membranes. Nasal mucosa swollen slightly erythematous with yellowish discharge. No sinus tenderness   Eyes: Conjunctiva normal.     CV: S1, S2.  RRR. No murmurs/rubs. .  No edema. Pulm: No abnormalities on inspection. Clear to auscultation bilaterally. No wheezing/rhonchi. Normal effort. GI: Soft, nontender, nondistended. Normal active bowel sounds.

## 2018-06-15 NOTE — PROGRESS NOTES
1. Have you been to the ER, urgent care clinic since your last visit? Hospitalized since your last visit? No.     2. Have you seen or consulted any other health care providers outside of the [address] [place] [address] since your last visit? Include any pap smears or colon screening. No chief complaint on file.

## 2018-06-15 NOTE — PROGRESS NOTES
A user error has taken place: encounter opened in error, closed for administrative reasons. Dr. Desire Kahn saw her instead.

## 2018-07-20 ENCOUNTER — OFFICE VISIT (OUTPATIENT)
Dept: FAMILY MEDICINE CLINIC | Age: 28
End: 2018-07-20

## 2018-07-20 VITALS
SYSTOLIC BLOOD PRESSURE: 115 MMHG | TEMPERATURE: 97.5 F | HEIGHT: 65 IN | WEIGHT: 136 LBS | RESPIRATION RATE: 18 BRPM | BODY MASS INDEX: 22.66 KG/M2 | HEART RATE: 71 BPM | DIASTOLIC BLOOD PRESSURE: 70 MMHG

## 2018-07-20 DIAGNOSIS — R21 SKIN RASH: ICD-10-CM

## 2018-07-20 DIAGNOSIS — B37.31 YEAST VAGINITIS: ICD-10-CM

## 2018-07-20 DIAGNOSIS — F32.1 CURRENT MODERATE EPISODE OF MAJOR DEPRESSIVE DISORDER WITHOUT PRIOR EPISODE (HCC): Primary | ICD-10-CM

## 2018-07-20 RX ORDER — FLUCONAZOLE 150 MG/1
150 TABLET ORAL DAILY
Qty: 1 TAB | Refills: 0 | Status: SHIPPED | OUTPATIENT
Start: 2018-07-20 | End: 2018-07-21

## 2018-07-20 NOTE — MR AVS SNAPSHOT
[facility]chan Nicolas [address] [address] [facility] [address] [facility] [address] [address] 
[phone] Patient: Jared Huang MRN: [id number] KTR:[id number] Visit Information Date & Time Provider Department Dept. Phone Encounter #  
 7/20/2018  2:00 PM Keyla Asifromain Ceja [id number] Follow-up Instructions Return if symptoms worsen or fail to improve. Upcoming Health Maintenance Date Due  
 PAP AKA CERVICAL CYTOLOGY 3/27/2017 Influenza Age 5 to Adult 8/1/2018 DTaP/Tdap/Td series (2 - Td) 8/12/2024 Allergies as of 7/20/2018  Review Complete On: 7/20/2018 By: Zen Dotson NP No Known Allergies Current Immunizations  Reviewed on 6/28/2016 Name Date HPV (Quad) 2/16/2015, 10/10/2014, 8/12/2014  9:46 AM  
 Influenza Vaccine 10/10/2014 Meningococcal (MCV4P) Vaccine 8/19/2014 Tdap 8/12/2014 Not reviewed this visit You Were Diagnosed With   
  
 Codes Comments Current moderate episode of major depressive disorder without prior episode ([facility] [address]    -  Primary ICD-10-CM: F32.1 ICD-9-CM: 296.22 Skin rash     ICD-10-CM: R21 
ICD-9-CM: 782.1 Yeast vaginitis     ICD-10-CM: B37.3 ICD-9-CM: 112.1 Vitals BP Pulse Temp Resp Height(growth percentile) Weight(growth percentile) 115/70 71 97.5 °F (36.4 °C) (Oral) 18 5' 5\" (1.651 m) 136 lb (61.7 kg) BMI OB Status Smoking Status 22.63 kg/m2 IUD Never Smoker Vitals History BMI and BSA Data Body Mass Index Body Surface Area  
 22.63 kg/m 2 1.68 m 2 Preferred Pharmacy Pharmacy Name Phone [phone] [facility] [place] [phone] Your Updated Medication List  
  
   
This list is accurate as of 7/20/18  2:59 PM.  Always use your most recent med list.  
  
  
  
  
 albuterol 90 mcg/actuation inhaler Commonly known as:  PROVENTIL HFA, VENTOLIN HFA, PROAIR HFA Take 1 Puff by inhalation every six (6) hours as needed for Wheezing (or cough). EXCEDRIN EXTRA STRENGTH PO Take  by mouth. fluconazole 150 mg tablet Commonly known as:  DIFLUCAN Take 1 Tab by mouth daily for 1 dose. FDA advises cautious prescribing of oral fluconazole in pregnancy. levonorgestrel 20 mcg/24 hr (5 years) Iud  
Commonly known as:  MIRENA  
1 each by IntraUTERine route once. lidocaine 2 % solution Commonly known as:  XYLOCAINE Take 15 mL by mouth as needed for Pain. Indications: MOUTH IRRITATION Prescriptions Sent to Pharmacy Refills  
 fluconazole (DIFLUCAN) 150 mg tablet 0 Sig: Take 1 Tab by mouth daily for 1 dose. FDA advises cautious prescribing of oral fluconazole in pregnancy. Class: Normal  
 [facility]acy: [facility] [address] [place] [address] #: [phone] Route: Oral  
  
Follow-up Instructions Return if symptoms worsen or fail to improve. Patient Instructions Use OTC vaginal itch cream 
 
 
  
Introducing [employer]! Dear Mala Mooring: Thank you for requesting a [employer] account. Our records indicate that you already have an active [employer] account. You can access your account anytime at [email or web address] Did you know that you can access your hospital and ER discharge instructions at any time in [employer]? You can also review all of your test results from your hospital stay or ER visit. Additional Information If you have questions, please visit the Frequently Asked Questions section of the [employer] website at [email or web address] Remember, [employer] is NOT to be used for urgent needs. For medical emergencies, dial 911. Now available from your iPhone and Android! Please provide this summary of care documentation to your next provider. Your primary care clinician is listed as CHAI Malone. If you have any questions after today's visit, please call [phone].

## 2018-07-20 NOTE — PROGRESS NOTES
Subjective:   Garrel Frankel is a 29 y.o. female here for an acute visit for    Chief Complaint   Patient presents with    Skin Problem     on/off for 3 weeks itching in perineal area to anus. Fasting: No    Last visit: Shari 15, 2018      HPI  Itch/rash to perineum    Onset Three weeks ago   Location perineum   Duration intermittent   Characteristics Itch, has not visualized, no odor, no discharge, no intertal itch, no urination problems, no problems with sexual intercourse, never had before, possible exposure to STI   Aggrevating nothing   Relieving nothing   Treatment nothing   Pertinent PMH none   Pertinent negatives No fever, n/v/c/d     Depression  Seeing therapist intermittnet for 1 month  Has been exercising  Thinks of suicide not current has not planned  Has supoort with friends     ROS  As above, the rest are negative   ROS    Current Outpatient Prescriptions   Medication Sig Dispense Refill    fluconazole (DIFLUCAN) 150 mg tablet Take 1 Tab by mouth daily for 1 dose. FDA advises cautious prescribing of oral fluconazole in pregnancy. 1 Tab 0    levonorgestrel (MIRENA) 20 mcg/24 hr (5 years) IUD 1 each by IntraUTERine route once.  lidocaine (XYLOCAINE) 2 % solution Take 15 mL by mouth as needed for Pain. Indications: MOUTH IRRITATION 1 Bottle 0    ASPIRIN/ACETAMINOPHEN/CAFFEINE (EXCEDRIN EXTRA STRENGTH PO) Take  by mouth.  albuterol (PROVENTIL HFA, VENTOLIN HFA, PROAIR HFA) 90 mcg/actuation inhaler Take 1 Puff by inhalation every six (6) hours as needed for Wheezing (or cough).  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 Physical Exam  Physical Exam   Constitutional: She is oriented to person, place, and time. Vital signs are normal. She appears distressed. Sitting upright in chair tearful. Currently seeing a counselor for depression   Cardiovascular: Normal rate, regular rhythm and normal heart sounds. Pulmonary/Chest: Effort normal and breath sounds normal.   Genitourinary: Vulva normal. Vulva exhibits no erythema. Thin  odorless  white and vaginal discharge found. Neurological: She is alert and oriented to person, place, and time. Gait normal.   Psychiatric: Memory, affect and judgment normal. She exhibits a depressed mood. She expresses no suicidal ideation. She expresses no suicidal plans. Normal squamous cells. Clue cells absent. Trichomonas absent. Pseudohyphae/buds present.       Labwork and Ancillary Studies:    CBC w/Diff  Lab Results   Component Value Date/Time    WBC 6.7 01/15/2018 10:21 AM    HGB 13.3 01/15/2018 10:21 AM    PLATELET 138 55/52/3325 10:21 AM         Basic Metabolic Profile  Lab Results   Component Value Date/Time    Sodium 137 05/15/2017 11:41 AM    Potassium 4.3 05/15/2017 11:41 AM    Chloride 102 05/15/2017 11:41 AM    CO2 29 05/15/2017 11:41 AM    Anion gap 6 05/15/2017 11:41 AM    Glucose 96 05/15/2017 11:41 AM    BUN 11 05/15/2017 11:41 AM    Creatinine 0.81 05/15/2017 11:41 AM    BUN/Creatinine ratio 14 05/15/2017 11:41 AM    GFR est AA >60 05/15/2017 11:41 AM    GFR est non-AA >60 05/15/2017 11:41 AM    Calcium 9.3 05/15/2017 11:41 AM        Cholesterol  Lab Results   Component Value Date/Time    Cholesterol, total 159 05/15/2017 11:41 AM    HDL Cholesterol 79 (H) 05/15/2017 11:41 AM    LDL, calculated 67.4 05/15/2017 11:41 AM    Triglyceride 63 05/15/2017 11:41 AM    CHOL/HDL Ratio 2.0 05/15/2017 11:41 AM          Assessment/Plan:    Diagnoses and all orders for this visit: 1. Current moderate episode of major depressive disorder without prior episode ([facility] [address]    2. Skin rash  -     fluconazole (DIFLUCAN) 150 mg tablet; Take 1 Tab by mouth daily for 1 dose. FDA advises cautious prescribing of oral fluconazole in pregnancy. 3. Yeast vaginitis  -     fluconazole (DIFLUCAN) 150 mg tablet; Take 1 Tab by mouth daily for 1 dose. FDA advises cautious prescribing of oral fluconazole in pregnancy. Mrs Kelly Ware has come in today for c/o perineal itching and depression. She has requested depression medication. However, she is currently see an psychotherapist. I informed her she should talk to them first about medication. She understood and will talk with her therapist    Health Maintenance:   Health Maintenance   Topic Date Due    PAP AKA CERVICAL CYTOLOGY  03/27/2017    Influenza Age 5 to Adult  08/01/2018    DTaP/Tdap/Td series (2 - Td) 08/12/2024       I have discussed the diagnosis with the patient and the intended plan as seen in the above orders. The patient has received an After-Visit Summary and questions were answered concerning future plans. Return to clinic if sxs persist, to ER if sxs worsen    Patient verbalized understanding to above instructions. AVS printed and given to pt. Follow-up Disposition:  Return if symptoms worsen or fail to improve. Maxi Muir, [facility]  [address] [facility]   [address] [facility] [address]  [id number]

## 2018-07-20 NOTE — PROGRESS NOTES
1. Have you been to the ER, urgent care clinic since your last visit? Hospitalized since your last visit? No    2. Have you seen or consulted any other health care providers outside of the [address] [place] [address] since your last visit? Include any pap smears or colon screening.  No

## 2018-09-06 RX ORDER — METRONIDAZOLE 500 MG/1
500 TABLET ORAL 2 TIMES DAILY
Qty: 14 TAB | Refills: 0 | Status: SHIPPED | OUTPATIENT
Start: 2018-09-06 | End: 2018-09-13

## 2019-06-18 ENCOUNTER — OFFICE VISIT (OUTPATIENT)
Dept: FAMILY MEDICINE CLINIC | Age: 29
End: 2019-06-18

## 2019-06-18 VITALS
DIASTOLIC BLOOD PRESSURE: 69 MMHG | HEIGHT: 65 IN | SYSTOLIC BLOOD PRESSURE: 110 MMHG | BODY MASS INDEX: 22.82 KG/M2 | TEMPERATURE: 99 F | HEART RATE: 104 BPM | RESPIRATION RATE: 16 BRPM | WEIGHT: 137 LBS

## 2019-06-18 DIAGNOSIS — J02.9 VIRAL PHARYNGITIS: Primary | ICD-10-CM

## 2019-06-18 DIAGNOSIS — J02.9 SORE THROAT: ICD-10-CM

## 2019-06-18 DIAGNOSIS — M54.6 ACUTE RIGHT-SIDED THORACIC BACK PAIN: ICD-10-CM

## 2019-06-18 PROBLEM — F32.A DEPRESSIVE DISORDER: Status: ACTIVE | Noted: 2019-01-11

## 2019-06-18 NOTE — PROGRESS NOTES
1. Have you been to the ER, urgent care clinic since your last visit? Hospitalized since your last visit? No    2. Have you seen or consulted any other health care providers outside of the [address] [place] [address] since your last visit? Include any pap smears or colon screening.  No      Chief Complaint   Patient presents with    Sore Throat     stared yesterday     Back Pain     chronic back pain      Visit Vitals  /69   Pulse (!) 104   Temp 99 °F (37.2 °C)   Resp 16   Ht 5' 5\" (1.651 m)   Wt 137 lb (62.1 kg)   BMI 22.80 kg/m²

## 2019-06-18 NOTE — PATIENT INSTRUCTIONS
Upper respiratory infections/sinusitis/colds    Take an antihistamine such as Zyrtec, Claritin, Xyzal  or Allegra for sinus and nasal congestion. All are available over the counter, generics are fine. Try a nasal rinse with a neti pot, or device of choice. Do not use tap water. Use distilled or sterile water available at the pharmacy. Ibuprofen or tylenol for aches and pains. They can be taken alternating between the two. Flonase nasal spray for nasal congestion, use daily. This is available over the counter. Flonase decreases inflammation and afrin dries up congestion. Take over the counter cough medication for cough. If you have high blood pressure, Coricidin has a high blood pressure formulation. Shoulder Blade: Exercises  Your Care Instructions  Here are some examples of typical exercises for your condition. Start each exercise slowly. Ease off the exercise if you start to have pain. Your doctor or physical therapist will tell you when you can start these exercises and which ones will work best for you. How to do the exercises  Shoulder roll    1. Stand tall with your chin slightly tucked. Imagine that a string at the top of your head is pulling you straight up. 2. Keep your arms relaxed. All motion will be in your shoulders. 3. Shrug your shoulders up toward your ears, then up and back. [place] your shoulders down and back, like you're sliding your hands down into your back pants pockets. 4. Repeat the circles at least 2 to 4 times. 5. This exercise is also helpful anytime you want to relax. Lower neck and upper back stretch    1. With your arms about shoulder height, clasp your hands in front of you. 2. Drop your chin toward your chest.  3. Reach straight forward so you are rounding your upper back. Think about pulling your shoulder blades apart. [place] feel a stretch across your upper back and shoulders. Hold for at least 6 seconds. 4. Repeat 2 to 4 times.     Triceps stretch    1. Reach your arm straight up. 2. Keeping your elbow in place, bend your arm and reach your hand down behind your back. 3. With your other hand, apply gentle pressure to the bent elbow. Bernadette Flowers feel a stretch at the back of your upper arm and shoulder. Hold about 6 seconds. 4. Repeat 2 to 4 times with each arm. Shoulder stretch    1. Relax your shoulders. 2. Raise one arm to shoulder height, and reach it across your chest.  3. Pull the arm slightly toward you with your other arm. This will help you get a gentle stretch. Hold for about 6 seconds. 4. Repeat 2 to 4 times. Shoulder blade squeeze    1. Sit or stand up tall with your arms at your sides. 2. Keep your shoulders relaxed and down, not shrugged. 3. Squeeze your shoulder blades together. Hold for 6 seconds, then relax. 4. Repeat 8 to 12 times. Straight-arm shoulder blade squeeze    1. Sit or stand tall. Relax your shoulders. 2. With palms down, hold your elastic tubing or band straight out in front of you. 3. Start with slight tension in the tubing or band, with your hands about shoulder-width apart. 4. Slowly pull straight out to the sides, squeezing your shoulder blades together. Keep your arms straight and at shoulder height. Slowly release. 5. Repeat 8 to 12 times. Rowing    1. [place] your elastic tubing or band at about waist height. Take one end in each hand. 2. Sit or stand with your feet hip-width apart. 3. Hold your arms straight in front of you. Adjust your distance to create slight tension in the tubing or band. 4. Slightly tuck your chin. Relax your shoulders. 5. Without shrugging your shoulders, pull straight back. Your elbows will pass alongside your waist.    Pull-downs    1. [place] your elastic tubing or band in the top of a closed door. Take one end in each hand. 2. Either sit or stand, depending on what is more comfortable. If you feel unsteady, sit on a chair.   3. Start with your arms up and comfortably apart, elbows straight. There should be a slight tension in the tubing or band. 4. Slightly tuck your chin, and look straight ahead. 5. Keeping your back straight, slowly pull down and back, bending your elbows. 6. Stop where your hands are level with your chin, in a \"goalpost\" position. 7. Repeat 8 to 12 times. Chest T stretch    1. Lie on your back. Raise your knees so they are bent. Plant your feet on the floor, hip-width apart. 2. Tuck your chin, and relax your shoulders. 3. Reach your arms straight out to the sides. If you don't feel a mild stretch in your shoulders and across your chest, use a foam roll or a tightly rolled blanket under your spine, from your tailbone to your head. 4. Relax in this position for at least 15 to 30 seconds while you breathe normally. Repeat 2 to 4 times. 5. As you get used to this stretch, keep adding a little more time until you are able relax in this position for 2 or 3 minutes. When you can relax for at least 2 minutes, you only need to do the exercise 1 time per session. Chest goalpost stretch    1. Lie on your back. Raise your knees so they are bent. Plant your feet on the floor, hip-width apart. 2. Tuck your chin, and relax your shoulders. 3. Reach your arms straight out to the sides. 4. Bend your arms at the elbows, with your hands pointed toward the top of your head. Your arms should make an L on either side of your head. Your palms should be facing up. 5. If you don't feel a mild stretch in your shoulders and across your chest, use a foam roll or tightly rolled blanket under your spine, from your tailbone to your head. 6. Relax in this position for at least 15 to 30 seconds while you breathe normally. Repeat 2 to 4 times. 7. Each day you do this exercise, add a little more time until you can relax in this position for 2 or 3 minutes. When you can relax for at least 2 minutes, you only need to do the exercise 1 time per session.     Follow-up care is a key part of your treatment and safety. Be sure to make and go to all appointments, and call your doctor if you are having problems. It's also a good idea to know your test results and keep a list of the medicines you take. Where can you learn more? Go to http://ann-hernandez.info/. Enter ([phone] [phone] in the search box to learn more about \"Shoulder Blade: Exercises. \"  Current as of: September 20, 2018  Content Version: 11.9  © 5359-3403 [employer]. Care instructions adapted under license by [employer] (which disclaims liability or warranty for this information). If you have questions about a medical condition or this instruction, always ask your healthcare professional. [address] any warranty or liability for your use of this information. Shoulder Arthritis: Exercises  Your Care Instructions  Here are some examples of typical rehabilitation exercises for your condition. Start each exercise slowly. Ease off the exercise if you start to have pain. Your doctor or physical therapist will tell you when you can start these exercises and which ones will work best for you. How to do the exercises  Shoulder flexion (lying down)    1. Lie on your back, holding a wand with both hands. Your palms should face down as you hold the wand. 2. Keeping your elbows straight, slowly raise your arms over your head. Raise them until you feel a stretch in your shoulders, upper back, and chest.  3. Hold for 15 to 30 seconds. 4. Repeat 2 to 4 times. Shoulder rotation (lying down)    1. Lie on your back. Hold a wand with both hands with your elbows bent and palms up. 2. Keep your elbows close to your body, and move the wand across your body toward the sore arm. 3. Hold for 8 to 12 seconds. 4. Repeat 2 to 4 times. Shoulder internal rotation with towel    1. Hold a towel above and behind your head with the arm that is not sore.   2. With your sore arm, reach behind your back and grasp the towel. 3. With the arm above your head, pull the towel upward. Do this until you feel a stretch on the front and outside of your sore shoulder. 4. Hold 15 to 30 seconds. 5. Repeat 2 to 4 times. Shoulder blade squeeze    1. Stand with your arms at your sides, and squeeze your shoulder blades together. Do not raise your shoulders up as you squeeze. 2. Hold 6 seconds. 3. Repeat 8 to 12 times. Resisted rows    1. Put the band around a solid object at about waist level. (A bedpost will work well.) Each hand should hold an end of the band. 2. With your elbows at your sides and bent to 90 degrees, pull the band back. Your shoulder blades should move toward each other. Return to the starting position. 3. Repeat 8 to 12 times. External rotator strengthening exercise    1. Start by tying a piece of elastic exercise material to a doorknob. You can use surgical tubing or Thera-Band. (You may also hold one end of the band in each hand.)  2. Stand or sit with your shoulder relaxed and your elbow bent 90 degrees. Your upper arm should rest comfortably against your side. Squeeze a rolled towel between your elbow and your body for comfort. This will help keep your arm at your side. 3. Hold one end of the elastic band with the hand of the painful arm. 4. Start with your forearm across your belly. Slowly rotate the forearm out away from your body. Keep your elbow and upper arm tucked against the towel roll or the side of your body until you begin to feel tightness in your shoulder. Slowly move your arm back to where you started. 5. Repeat 8 to 12 times. Internal rotator strengthening exercise    1. Start by tying a piece of elastic exercise material to a doorknob. You can use surgical tubing or Thera-Band. 2. Stand or sit with your shoulder relaxed and your elbow bent 90 degrees. Your upper arm should rest comfortably against your side.  Squeeze a rolled towel between your elbow and your body for comfort. This will help keep your arm at your side. 3. Hold one end of the elastic band in the hand of the painful arm. 4. Slowly rotate your forearm toward your body until it touches your belly. Slowly move it back to where you started. 5. Keep your elbow and upper arm firmly tucked against the towel roll or at your side. 6. Repeat 8 to 12 times. Pendulum swing    1. Hold on to a table or the back of a chair with your good arm. Then bend forward a little and let your sore arm hang straight down. This exercise does not use the arm muscles. Rather, use your legs and your hips to create movement that makes your arm swing freely. 2. Use the movement from your hips and legs to guide the slightly swinging arm back and forth like a pendulum (or elephant trunk). Then guide it in circles that start small (about the size of a dinner plate). Make the circles a bit larger each day, as your pain allows. 3. Do this exercise for 5 minutes, 5 to 7 times each day. 4. As you have less pain, try bending over a little farther to do this exercise. This will increase the amount of movement at your shoulder. Follow-up care is a key part of your treatment and safety. Be sure to make and go to all appointments, and call your doctor if you are having problems. It's also a good idea to know your test results and keep a list of the medicines you take. Where can you learn more? Go to http://ann-hernandez.info/. Enter H562 in the search box to learn more about \"Shoulder Arthritis: Exercises. \"  Current as of: September 20, 2018  Content Version: 11.9  © 1858-2165 [employer]. Care instructions adapted under license by [employer] (which disclaims liability or warranty for this information).  If you have questions about a medical condition or this instruction, always ask your healthcare professional. [place] [address] any warranty or liability for your use of this information.
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
## 2020-02-03 ENCOUNTER — HOSPITAL ENCOUNTER (OUTPATIENT)
Dept: LAB | Age: 30
Discharge: HOME | End: 2020-02-03
Payer: COMMERCIAL

## 2020-02-03 ENCOUNTER — OFFICE VISIT (OUTPATIENT)
Dept: FAMILY MEDICINE CLINIC | Age: 30
End: 2020-02-03

## 2020-02-03 VITALS
HEIGHT: 65 IN | RESPIRATION RATE: 12 BRPM | SYSTOLIC BLOOD PRESSURE: 134 MMHG | DIASTOLIC BLOOD PRESSURE: 69 MMHG | WEIGHT: 140 LBS | HEART RATE: 98 BPM | OXYGEN SATURATION: 100 % | BODY MASS INDEX: 23.32 KG/M2 | TEMPERATURE: 98.1 F

## 2020-02-03 DIAGNOSIS — Z11.3 SCREEN FOR STD (SEXUALLY TRANSMITTED DISEASE): ICD-10-CM

## 2020-02-03 DIAGNOSIS — H65.02 NON-RECURRENT ACUTE SEROUS OTITIS MEDIA OF LEFT EAR: Primary | ICD-10-CM

## 2020-02-03 PROCEDURE — 87389 HIV-1 AG W/HIV-1&-2 AB AG IA: CPT

## 2020-02-03 PROCEDURE — 86780 TREPONEMA PALLIDUM: CPT

## 2020-02-03 PROCEDURE — 87491 CHLMYD TRACH DNA AMP PROBE: CPT

## 2020-02-03 RX ORDER — AMOXICILLIN AND CLAVULANATE POTASSIUM 875; 125 MG/1; MG/1
1 TABLET, FILM COATED ORAL EVERY 12 HOURS
Qty: 20 TAB | Refills: 0 | Status: SHIPPED | OUTPATIENT
Start: 2020-02-03 | End: 2020-02-13

## 2020-02-03 NOTE — PROGRESS NOTES
[address] [place] [address]               [phone]    Subjective:   Emily Madrigal is a 27 y.o. female who complains of congestion and left ear hearing loss, pressure for 7 days, unchanged since that time. She denies a history of anorexia, chest pain, chills, dizziness, fatigue, fevers, myalgias, nausea, shortness of breath, vomiting and wheezing. Evaluation to date: none. Treatment to date: OTC products. Patient does not smoke cigarettes. Relevant PMH: No pertinent additional PMH. STD concerns  sexually transmitted disease. Current symptoms are: none. Onset of symptoms was abrupt and none, and has been stable since that time. Sexual history reviewed with the patient. STD exposure: denies knowledge of risky exposure and current sexual partner thought to have no history of any STD. Previous STD history: chlamydia  HIV Status: the patient is HIV negative. 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  amoxicillin-clavulanate (AUGMENTIN) 875-125 mg per tablet Take 1 Tab by mouth every twelve (12) hours for 10 days. 20 Tab 0    ASPIRIN/ACETAMINOPHEN/CAFFEINE (EXCEDRIN EXTRA STRENGTH PO) Take  by mouth.  levonorgestrel (MIRENA) 20 mcg/24 hr (5 years) IUD 1 each by IntraUTERine route once. No Known Allergies  Past Medical History:   Diagnosis Date    Asthma     childhood    Back problem      History reviewed. No pertinent surgical history.   Family History   Problem Relation Age of Onset    Diabetes Mother     Cancer Maternal Grandmother     Arthritis-osteo Paternal Grandmother      Social History     Tobacco Use    Smoking status: Never Smoker    Smokeless tobacco: Never Used   Substance Use Topics    Alcohol use: Yes        Review of Systems  Pertinent items are noted in HPI. Objective:     Visit Vitals  /69   Pulse 98   Temp 98.1 °F (36.7 °C) (Oral)   Resp 12   Ht 5' 5\" (1.651 m)   Wt 140 lb (63.5 kg)   SpO2 100%   BMI 23.30 kg/m²     General:  alert, cooperative, no distress   Eyes: negative   Ears: abnormal TM AS - bulging, serous middle ear fluid   Sinuses: Normal paranasal sinuses without tenderness   Mouth:  Lips, mucosa, and tongue normal. Teeth and gums normal   Neck: supple, symmetrical, trachea midline and no adenopathy. Heart: S1 and S2 normal, no murmurs noted. Lungs: clear to auscultation bilaterally   Abdomen: soft, non-tender. Bowel sounds normal. No masses,  no organomegaly        Assessment/Plan:   otitis media and serous otitis  Suggested symptomatic OTC remedies. Antibiotics per orders. RTC prn. Discussed diagnosis and treatment of viral URIs. ICD-10-CM ICD-9-CM    1. Non-recurrent acute serous otitis media of left ear H65.02 381.01 amoxicillin-clavulanate (AUGMENTIN) 875-125 mg per tablet   2. Screen for STD (sexually transmitted disease) Z11.3 V74.5 CT/NG/T.VAGINALIS AMPLIFICATION      HIV 1/2 AG/AB, 4TH GENERATION,W RFLX CONFIRM      T PALLIDUM AB     Left ear otitis media we will treat with antibiotics, instructed her to continue use of antihistamines to decrease the amount of fluid behind her eardrums    Requesting annual testing for sexually transmitted diseases, orders entered    An After Visit Summary was printed and given to the patient. All diagnosis have been discussed with the patient and all of the patient's questions have been answered.      Follow-up and Dispositions    · Return if symptoms worsen or fail to improve. George Wilks, [place] [address] [address] [place].   Jeffry Gomez

## 2020-02-03 NOTE — PROGRESS NOTES
Chief Complaint   Patient presents with    Ear Pain     left ear pain x week- more pressure than anything     Nasal Congestion         1. Have you been to the ER, urgent care clinic since your last visit? Hospitalized since your last visit? NO    2. Have you seen or consulted any other health care providers outside of the [address] [place] [address] since your last visit? Include any pap smears or colon screening.  NO

## 2020-02-04 LAB
HIV 1+2 AB+HIV1 P24 AG SERPL QL IA: NONREACTIVE
HIV12 RESULT COMMENT, HHIVC: NORMAL
T PALLIDUM AB SER QL IA: NONREACTIVE

## 2020-02-08 LAB
C TRACH RRNA SPEC QL NAA+PROBE: POSITIVE
N GONORRHOEA RRNA SPEC QL NAA+PROBE: NEGATIVE
SPECIMEN SOURCE: ABNORMAL
T VAGINALIS RRNA VAG QL NAA+PROBE: NEGATIVE

## 2020-02-10 ENCOUNTER — TELEPHONE (OUTPATIENT)
Dept: FAMILY MEDICINE CLINIC | Age: 30
End: 2020-02-10

## 2020-02-10 DIAGNOSIS — A74.9 CHLAMYDIA: Primary | ICD-10-CM

## 2020-02-10 RX ORDER — DOXYCYCLINE 100 MG/1
100 CAPSULE ORAL 2 TIMES DAILY
Qty: 14 CAP | Refills: 0 | Status: SHIPPED | OUTPATIENT
Start: 2020-02-10 | End: 2020-02-17

## 2020-02-10 NOTE — TELEPHONE ENCOUNTER
Called and informed her she was positive for chlamydia, informed her prescription is sent to her pharmacy on file  All diagnosis have been discussed with the patient and all of the patient's questions have been answered.

## 2020-02-17 ENCOUNTER — TELEPHONE (OUTPATIENT)
Dept: FAMILY MEDICINE CLINIC | Age: 30
End: 2020-02-17

## 2020-02-17 NOTE — TELEPHONE ENCOUNTER
Call pt back  Answered any questions she had  States she went to her regular OB and got retested and she is currently awaiting those results  All diagnosis have been discussed with the patient and all of the patient's questions have been answered.

## 2020-02-17 NOTE — TELEPHONE ENCOUNTER
Patient called stating that she has questions about her lab results from last week.  Patient is requesting a call back

## 2020-10-09 RX ORDER — METRONIDAZOLE 7.5 MG/G
1 GEL VAGINAL
Qty: 25 G | Refills: 0 | Status: SHIPPED | OUTPATIENT
Start: 2020-10-09 | End: 2020-10-14

## 2020-12-10 ENCOUNTER — VIRTUAL VISIT (OUTPATIENT)
Dept: FAMILY MEDICINE CLINIC | Age: 30
End: 2020-12-10
Payer: COMMERCIAL

## 2020-12-10 DIAGNOSIS — F41.8 ANXIETY WITH DEPRESSION: Primary | ICD-10-CM

## 2020-12-10 PROCEDURE — 99214 OFFICE O/P EST MOD 30 MIN: CPT | Performed by: INTERNAL MEDICINE

## 2020-12-10 RX ORDER — LORAZEPAM 1 MG/1
TABLET ORAL
Qty: 20 TAB | Refills: 0 | Status: SHIPPED | OUTPATIENT
Start: 2020-12-10

## 2020-12-10 RX ORDER — VENLAFAXINE HYDROCHLORIDE 75 MG/1
75 CAPSULE, EXTENDED RELEASE ORAL DAILY
Qty: 30 CAP | Refills: 5 | Status: SHIPPED | OUTPATIENT
Start: 2020-12-10

## 2020-12-10 RX ORDER — VENLAFAXINE HYDROCHLORIDE 37.5 MG/1
37.5 CAPSULE, EXTENDED RELEASE ORAL DAILY
Qty: 7 CAP | Refills: 0 | Status: SHIPPED | OUTPATIENT
Start: 2020-12-10

## 2020-12-10 RX ORDER — VALACYCLOVIR HYDROCHLORIDE 500 MG/1
500 TABLET, FILM COATED ORAL DAILY
COMMUNITY

## 2020-12-10 NOTE — PROGRESS NOTES
Macey Gtz is a 27 y.o. female who was seen by synchronous (real-time) audio-video technology on 12/10/2020 for Follow Up Chronic Condition and Depression (feeling down and depressed gotten worst over the last 2 months. )        Assessment & Plan:   Diagnoses and all orders for this visit:    1. Anxiety with depression-discussed in detail with patientwe will start low-dose Effexor for 1 week then increase to 75 mg. Additionally will prescribe Ativan to use for up to 1 week-discussed risks of sedation and addiction with this medication and recommended avoidance of this for long-term use. Encouraged to use the SSRI for a period of 6 months before considering cessation unless having severe side effects. Encouraged continued counseling as well. She will follow-up in 1 to 2 months having any problems but otherwise in 6 months. Additionally counseled on calling for any suicidal or homicidal ideation  -     LORazepam (ATIVAN) 1 mg tablet; Take 1/2 or 1 full tablet every 6 hours as needed for anxiety. Other orders  -     venlafaxine-SR [facility] 75 mg capsule; Take 1 Cap by mouth daily. Use for one week then increase to 75 mg daily      712  Subjective: Anxiety with depressionhad seen psychiatrist in the past and started on Lexapro and Wellbutrin but did not like side effects of decreased libido she reports. Started seeing psychologist but they had recommended she start an SSRI or Ativan. No SI/HI. Prior to Admission medications    Medication Sig Start Date End Date Taking? Authorizing Provider   valACYclovir (Valtrex) 500 mg tablet Take 500 mg by mouth daily. Yes Provider, Historical   venlafaxine-SR (EFFEXOR-XR) 75 mg capsule Take 1 Cap by mouth daily. Use for one week then increase to 75 mg daily 12/10/20  Yes Kathrin Doe MD   levonorgestrel (MIRENA) 20 mcg/24 hr (5 years) IUD 1 each by IntraUTERine route once.    Yes Provider, Historical   guaifen/phenyleph/acetaminophn (COLD AND SINUS MULTI-SYMPTOM PO) Take  by mouth. Provider, Historical   ASPIRIN/ACETAMINOPHEN/CAFFEINE (EXCEDRIN EXTRA STRENGTH PO) Take  by mouth. Provider, Historical     Patient Active Problem List   Diagnosis Code    PPD positive R76.11    IUD (intrauterine device) in place Z97.5    Depressive disorder F32.9     Patient Active Problem List    Diagnosis Date Noted    Depressive disorder 01/11/2019    IUD (intrauterine device) in place 05/15/2017    PPD positive 06/11/2013     Current Outpatient Medications   Medication Sig Dispense Refill    valACYclovir (Valtrex) 500 mg tablet Take 500 mg by mouth daily.  venlafaxine-SR (EFFEXOR-XR) 75 mg capsule Take 1 Cap by mouth daily. Use for one week then increase to 75 mg daily 30 Cap 5    LORazepam (ATIVAN) 1 mg tablet Take 1/2 or 1 full tablet every 6 hours as needed for anxiety. 20 Tab 0    levonorgestrel (MIRENA) 20 mcg/24 hr (5 years) IUD 1 each by IntraUTERine route once.  guaifen/phenyleph/acetaminophn (COLD AND SINUS MULTI-SYMPTOM PO) Take  by mouth.  ASPIRIN/ACETAMINOPHEN/CAFFEINE (EXCEDRIN EXTRA STRENGTH PO) Take  by mouth. No Known Allergies  Past Medical History:   Diagnosis Date    Asthma     childhood    Back problem     Genital herpes      History reviewed. No pertinent surgical history. Family History   Problem Relation Age of Onset    Diabetes Mother     Cancer Maternal Grandmother     Arthritis-osteo Paternal Grandmother      Social History     Tobacco Use    Smoking status: Never Smoker    Smokeless tobacco: Never Used   Substance Use Topics    Alcohol use: Yes       ROS  Cardiac- no chest pain or palpitations  Pulmonary- no sob or wheezes  GI- no n/v or diarrhea. Objective:   No flowsheet data found.    General: alert, cooperative, no distress   Mental  status: normal mood, behavior, speech, dress, motor activity, and thought processes, able to follow commands   HENT: NCAT   Neck: no visualized mass   Resp: no respiratory distress   Neuro: no gross deficits   Skin: no discoloration or lesions of concern on visible areas   Psychiatric: normal affect, consistent with stated mood, no evidence of hallucinations     Additional exam findings: We discussed the expected course, resolution and complications of the diagnosis(es) in detail. Medication risks, benefits, costs, interactions, and alternatives were discussed as indicated. I advised her to contact the office if her condition worsens, changes or fails to improve as anticipated. She expressed understanding with the diagnosis(es) and plan. Peewee Wiley, who was evaluated through a patient-initiated, synchronous (real-time) audio-video encounter, and/or her healthcare decision maker, is aware that it is a billable service, with coverage as determined by her insurance carrier. She provided verbal consent to proceed: Yes, and patient identification was verified. It was conducted pursuant to the emergency declaration under the [address] [place] [address], [address] [place] [address] authority and the [employer] Act. A caregiver was present when appropriate. Ability to conduct physical exam was limited. I was at home. The patient was at home.       Renee Monge MD

## 2020-12-10 NOTE — PROGRESS NOTES
1. Have you been to the ER, urgent care clinic since your last visit? Hospitalized since your last visit? No.     2. Have you seen or consulted any other health care providers outside of the [address] [place] [address] since your last visit? Include any pap smears or colon screening. No.    Chief Complaint   Patient presents with    Follow Up Chronic Condition    Depression     feeling down and depressed gotten worst over the last 2 months.
